# Patient Record
Sex: MALE | Race: WHITE | NOT HISPANIC OR LATINO
[De-identification: names, ages, dates, MRNs, and addresses within clinical notes are randomized per-mention and may not be internally consistent; named-entity substitution may affect disease eponyms.]

---

## 2019-08-26 ENCOUNTER — APPOINTMENT (OUTPATIENT)
Dept: INTERNAL MEDICINE | Facility: CLINIC | Age: 64
End: 2019-08-26

## 2020-03-13 ENCOUNTER — APPOINTMENT (OUTPATIENT)
Dept: INTERNAL MEDICINE | Facility: CLINIC | Age: 65
End: 2020-03-13

## 2020-11-13 ENCOUNTER — APPOINTMENT (OUTPATIENT)
Dept: INTERNAL MEDICINE | Facility: CLINIC | Age: 65
End: 2020-11-13
Payer: MEDICARE

## 2020-11-13 VITALS
OXYGEN SATURATION: 99 % | WEIGHT: 234 LBS | DIASTOLIC BLOOD PRESSURE: 90 MMHG | BODY MASS INDEX: 34.66 KG/M2 | HEIGHT: 69 IN | HEART RATE: 78 BPM | SYSTOLIC BLOOD PRESSURE: 130 MMHG

## 2020-11-13 DIAGNOSIS — J45.909 UNSPECIFIED ASTHMA, UNCOMPLICATED: ICD-10-CM

## 2020-11-13 DIAGNOSIS — Z83.3 FAMILY HISTORY OF DIABETES MELLITUS: ICD-10-CM

## 2020-11-13 DIAGNOSIS — I10 ESSENTIAL (PRIMARY) HYPERTENSION: ICD-10-CM

## 2020-11-13 PROCEDURE — 99203 OFFICE O/P NEW LOW 30 MIN: CPT

## 2020-11-13 NOTE — ASSESSMENT
[FreeTextEntry1] : Current blood pressure 140/80. Will continue current dose of amlodipine. Will start patient on Advair 250/50 b.i.d. Patient will return for fasting labs. Patient is to return in 6 weeks for complete exam.

## 2020-11-13 NOTE — PHYSICAL EXAM
[No Acute Distress] : no acute distress [Well Nourished] : well nourished [Well Developed] : well developed [Well-Appearing] : well-appearing [Normal Sclera/Conjunctiva] : normal sclera/conjunctiva [PERRL] : pupils equal round and reactive to light [EOMI] : extraocular movements intact [Normal Outer Ear/Nose] : the outer ears and nose were normal in appearance [Normal Oropharynx] : the oropharynx was normal [No JVD] : no jugular venous distention [No Lymphadenopathy] : no lymphadenopathy [Supple] : supple [Thyroid Normal, No Nodules] : the thyroid was normal and there were no nodules present [No Respiratory Distress] : no respiratory distress  [No Accessory Muscle Use] : no accessory muscle use [Clear to Auscultation] : lungs were clear to auscultation bilaterally [Normal Rate] : normal rate  [Regular Rhythm] : with a regular rhythm [Normal S1, S2] : normal S1 and S2 [No Murmur] : no murmur heard [No Carotid Bruits] : no carotid bruits [No Abdominal Bruit] : a ~M bruit was not heard ~T in the abdomen [No Varicosities] : no varicosities [Pedal Pulses Present] : the pedal pulses are present [No Palpable Aorta] : no palpable aorta [No Extremity Clubbing/Cyanosis] : no extremity clubbing/cyanosis [Soft] : abdomen soft [Non Tender] : non-tender [Non-distended] : non-distended [No Masses] : no abdominal mass palpated [No HSM] : no HSM [Normal Bowel Sounds] : normal bowel sounds [Normal Posterior Cervical Nodes] : no posterior cervical lymphadenopathy [Normal Anterior Cervical Nodes] : no anterior cervical lymphadenopathy [No CVA Tenderness] : no CVA  tenderness [No Spinal Tenderness] : no spinal tenderness [No Joint Swelling] : no joint swelling [Grossly Normal Strength/Tone] : grossly normal strength/tone [No Rash] : no rash [Coordination Grossly Intact] : coordination grossly intact [No Focal Deficits] : no focal deficits [Normal Gait] : normal gait [Normal Affect] : the affect was normal [Normal Insight/Judgement] : insight and judgment were intact [de-identified] : 1+ edema

## 2020-11-13 NOTE — HISTORY OF PRESENT ILLNESS
[FreeTextEntry1] : Evaluation for hypertension [de-identified] : 65-year-old male who recently 2 weeks ago went to an urgent care clinic for high problems. He states his blood pressure was 180/118. He was placed on amlodipine 5 mg. He checks his blood pressure in the morning and is usually about 140/80. He feels generally well. He has a history of intermittent asthma on Ventolin p.r.n. The past few weeks his asthma is bothering him and he's been using an inhaler about 2 times per day. He works in injection. He is 5 feet 9 weight 234. He is a nonsmoker. Past surgery total hip replacement. Current blood pressure 140/80.

## 2020-11-15 ENCOUNTER — LABORATORY RESULT (OUTPATIENT)
Age: 65
End: 2020-11-15

## 2020-11-16 ENCOUNTER — APPOINTMENT (OUTPATIENT)
Dept: INTERNAL MEDICINE | Facility: CLINIC | Age: 65
End: 2020-11-16

## 2020-11-16 RX ORDER — FLUTICASONE PROPIONATE AND SALMETEROL 250; 50 UG/1; UG/1
250-50 POWDER RESPIRATORY (INHALATION)
Qty: 1 | Refills: 4 | Status: ACTIVE | COMMUNITY
Start: 2020-11-16 | End: 1900-01-01

## 2020-11-16 RX ORDER — FLUTICASONE FUROATE AND VILANTEROL TRIFENATATE 100; 25 UG/1; UG/1
100-25 POWDER RESPIRATORY (INHALATION)
Qty: 1 | Refills: 5 | Status: ACTIVE | COMMUNITY
Start: 2020-11-16 | End: 1900-01-01

## 2020-12-04 ENCOUNTER — APPOINTMENT (OUTPATIENT)
Dept: INTERNAL MEDICINE | Facility: CLINIC | Age: 65
End: 2020-12-04
Payer: MEDICARE

## 2020-12-04 VITALS
BODY MASS INDEX: 34.66 KG/M2 | OXYGEN SATURATION: 98 % | DIASTOLIC BLOOD PRESSURE: 90 MMHG | TEMPERATURE: 97.5 F | SYSTOLIC BLOOD PRESSURE: 120 MMHG | WEIGHT: 234 LBS | HEIGHT: 69 IN | HEART RATE: 75 BPM

## 2020-12-04 PROCEDURE — 99213 OFFICE O/P EST LOW 20 MIN: CPT

## 2020-12-04 NOTE — ASSESSMENT
[FreeTextEntry1] : Patient is advised to lose weight and to return in 3 months. He also will be given a referral for an endocrine consult regarding low testosterone level.

## 2020-12-04 NOTE — HISTORY OF PRESENT ILLNESS
[FreeTextEntry1] : Patient returns for blood pressure check and followup labs [de-identified] : Patient is feeling better with asthma improved control with Advair. He has not lost weight. He feels generally well. No other prior history of hepatitis C with treatment 10 years ago. Labs were reviewed. He has a markedly low testosterone level. Patient will be referred to endocrine. Current blood pressure 130/90.

## 2021-02-12 ENCOUNTER — HOSPITAL ENCOUNTER (INPATIENT)
Dept: HOSPITAL 74 - JER | Age: 66
LOS: 4 days | Discharge: HOME | DRG: 603 | End: 2021-02-16
Attending: NURSE PRACTITIONER | Admitting: INTERNAL MEDICINE
Payer: COMMERCIAL

## 2021-02-12 VITALS — BODY MASS INDEX: 37.2 KG/M2

## 2021-02-12 DIAGNOSIS — G45.9: ICD-10-CM

## 2021-02-12 DIAGNOSIS — F11.20: ICD-10-CM

## 2021-02-12 DIAGNOSIS — J45.909: ICD-10-CM

## 2021-02-12 DIAGNOSIS — F14.10: ICD-10-CM

## 2021-02-12 DIAGNOSIS — B19.20: ICD-10-CM

## 2021-02-12 DIAGNOSIS — R55: ICD-10-CM

## 2021-02-12 DIAGNOSIS — I10: ICD-10-CM

## 2021-02-12 DIAGNOSIS — E66.9: ICD-10-CM

## 2021-02-12 DIAGNOSIS — L03.116: ICD-10-CM

## 2021-02-12 DIAGNOSIS — L03.115: Primary | ICD-10-CM

## 2021-02-12 PROCEDURE — C9803 HOPD COVID-19 SPEC COLLECT: HCPCS

## 2021-02-12 PROCEDURE — U0003 INFECTIOUS AGENT DETECTION BY NUCLEIC ACID (DNA OR RNA); SEVERE ACUTE RESPIRATORY SYNDROME CORONAVIRUS 2 (SARS-COV-2) (CORONAVIRUS DISEASE [COVID-19]), AMPLIFIED PROBE TECHNIQUE, MAKING USE OF HIGH THROUGHPUT TECHNOLOGIES AS DESCRIBED BY CMS-2020-01-R: HCPCS

## 2021-02-12 PROCEDURE — G0378 HOSPITAL OBSERVATION PER HR: HCPCS

## 2021-02-13 LAB
ALBUMIN SERPL-MCNC: 3.9 G/DL (ref 3.4–5)
ALP SERPL-CCNC: 69 U/L (ref 45–117)
ALT SERPL-CCNC: 19 U/L (ref 13–61)
AMPHET UR-MCNC: NEGATIVE NG/ML
ANION GAP SERPL CALC-SCNC: 7 MMOL/L (ref 8–16)
APPEARANCE UR: CLEAR
AST SERPL-CCNC: 18 U/L (ref 15–37)
BARBITURATES UR-MCNC: NEGATIVE NG/ML
BASOPHILS # BLD: 0.5 % (ref 0–2)
BENZODIAZ UR SCN-MCNC: NEGATIVE NG/ML
BILIRUB SERPL-MCNC: 0.7 MG/DL (ref 0.2–1)
BILIRUB UR STRIP.AUTO-MCNC: NEGATIVE MG/DL
BUN SERPL-MCNC: 18.2 MG/DL (ref 7–18)
CALCIUM SERPL-MCNC: 8.8 MG/DL (ref 8.5–10.1)
CHLORIDE SERPL-SCNC: 104 MMOL/L (ref 98–107)
CHOLEST SERPL-MCNC: 149 MG/DL (ref 50–200)
CO2 SERPL-SCNC: 28 MMOL/L (ref 21–32)
COCAINE UR-MCNC: NEGATIVE NG/ML
COLOR UR: YELLOW
CREAT SERPL-MCNC: 1.1 MG/DL (ref 0.55–1.3)
DEPRECATED RDW RBC AUTO: 14 % (ref 11.9–15.9)
EOSINOPHIL # BLD: 2.6 % (ref 0–4.5)
GLUCOSE SERPL-MCNC: 85 MG/DL (ref 74–106)
HCT VFR BLD CALC: 36.1 % (ref 35.4–49)
HDLC SERPL-MCNC: 66 MG/DL (ref 40–60)
HGB BLD-MCNC: 12.2 GM/DL (ref 11.7–16.9)
KETONES UR QL STRIP: (no result)
LDLC SERPL CALC-MCNC: 70 MG/DL (ref 5–100)
LEUKOCYTE ESTERASE UR QL STRIP.AUTO: NEGATIVE
LYMPHOCYTES # BLD: 13.4 % (ref 8–40)
MCH RBC QN AUTO: 32.1 PG (ref 25.7–33.7)
MCHC RBC AUTO-ENTMCNC: 33.9 G/DL (ref 32–35.9)
MCV RBC: 94.7 FL (ref 80–96)
METHADONE UR-MCNC: POSITIVE NG/ML
MONOCYTES # BLD AUTO: 8.8 % (ref 3.8–10.2)
NEUTROPHILS # BLD: 74.7 % (ref 42.8–82.8)
NITRITE UR QL STRIP: NEGATIVE
OPIATES UR QL SCN: POSITIVE NG/ML
PCP UR QL SCN: NEGATIVE NG/ML
PH UR: 5 [PH] (ref 5–8)
PLATELET # BLD AUTO: 284 K/MM3 (ref 134–434)
PMV BLD: 7.1 FL (ref 7.5–11.1)
POTASSIUM SERPLBLD-SCNC: 5.1 MMOL/L (ref 3.5–5.1)
PROT SERPL-MCNC: 8.2 G/DL (ref 6.4–8.2)
PROT UR QL STRIP: (no result)
PROT UR QL STRIP: NEGATIVE
RBC # BLD AUTO: 3.82 M/MM3 (ref 4–5.6)
SODIUM SERPL-SCNC: 139 MMOL/L (ref 136–145)
SP GR UR: 1.03 (ref 1.01–1.03)
TRIGL SERPL-MCNC: 77 MG/DL (ref 0–150)
UROBILINOGEN UR STRIP-MCNC: 1 MG/DL (ref 0.2–1)
WBC # BLD AUTO: 7.2 K/MM3 (ref 4–10)

## 2021-02-13 RX ADMIN — METHADONE HYDROCHLORIDE SCH MG: 40 TABLET ORAL at 12:42

## 2021-02-13 RX ADMIN — CLINDAMYCIN PHOSPHATE SCH MLS/HR: 600 INJECTION, SOLUTION INTRAVENOUS at 09:17

## 2021-02-13 RX ADMIN — ENOXAPARIN SODIUM SCH MG: 40 INJECTION SUBCUTANEOUS at 09:17

## 2021-02-13 RX ADMIN — CLINDAMYCIN PHOSPHATE SCH MLS/HR: 600 INJECTION, SOLUTION INTRAVENOUS at 18:10

## 2021-02-14 LAB
ALBUMIN SERPL-MCNC: 3.6 G/DL (ref 3.4–5)
ALP SERPL-CCNC: 49 U/L (ref 45–117)
ALT SERPL-CCNC: 11 U/L (ref 13–61)
ANION GAP SERPL CALC-SCNC: 8 MMOL/L (ref 8–16)
AST SERPL-CCNC: 18 U/L (ref 15–37)
BASOPHILS # BLD: 2.9 % (ref 0–2)
BILIRUB SERPL-MCNC: 0.9 MG/DL (ref 0.2–1)
BUN SERPL-MCNC: 21 MG/DL (ref 7–18)
CALCIUM SERPL-MCNC: 8.6 MG/DL (ref 8.5–10)
CHLORIDE SERPL-SCNC: 102 MMOL/L (ref 98–107)
CHOLEST SERPL-MCNC: 127 MG/DL (ref 50–200)
CO2 SERPL-SCNC: 25 MMOL/L (ref 21–32)
CREAT SERPL-MCNC: 0.9 MG/DL (ref 0.55–1.3)
DEPRECATED RDW RBC AUTO: 13.1 % (ref 11.9–15.9)
EOSINOPHIL # BLD: 5.3 % (ref 0–4.5)
GLUCOSE SERPL-MCNC: 78 MG/DL (ref 74–106)
HCT VFR BLD CALC: 37.5 % (ref 35.4–49)
HDLC SERPL-MCNC: 52 MG/DL (ref 40–60)
HGB BLD-MCNC: 11.9 GM/DL (ref 11.7–16.9)
LDLC SERPL CALC-MCNC: 67 MG/DL (ref 5–100)
LYMPHOCYTES # BLD: 24.2 % (ref 8–40)
MAGNESIUM SERPL-MCNC: 2 MG/DL (ref 1.8–2.4)
MCH RBC QN AUTO: 30.5 PG (ref 25.7–33.7)
MCHC RBC AUTO-ENTMCNC: 31.8 G/DL (ref 32–35.9)
MCV RBC: 95.9 FL (ref 80–96)
MONOCYTES # BLD AUTO: 12.7 % (ref 3.8–10.2)
NEUTROPHILS # BLD: 54.9 % (ref 42.8–82.8)
PHOSPHATE SERPL-MCNC: 3.2 MG/DL (ref 2.5–4.9)
PLATELET # BLD AUTO: 240 K/MM3 (ref 134–434)
PMV BLD: 7.6 FL (ref 7.5–11.1)
POTASSIUM SERPLBLD-SCNC: 4.7 MMOL/L (ref 3.5–5.1)
PROT SERPL-MCNC: 7.2 G/DL (ref 6.4–8.2)
RBC # BLD AUTO: 3.91 M/MM3 (ref 4–5.6)
SODIUM SERPL-SCNC: 135 MMOL/L (ref 136–145)
TRIGL SERPL-MCNC: 39 MG/DL (ref 0–150)
WBC # BLD AUTO: 4.9 K/MM3 (ref 4–10.8)

## 2021-02-14 RX ADMIN — CLINDAMYCIN PHOSPHATE SCH MLS/HR: 600 INJECTION, SOLUTION INTRAVENOUS at 01:07

## 2021-02-14 RX ADMIN — CLINDAMYCIN PHOSPHATE SCH MLS/HR: 600 INJECTION, SOLUTION INTRAVENOUS at 18:01

## 2021-02-14 RX ADMIN — METHADONE HYDROCHLORIDE SCH MG: 40 TABLET ORAL at 05:52

## 2021-02-14 RX ADMIN — DOCUSATE SODIUM SCH MG: 100 CAPSULE, LIQUID FILLED ORAL at 21:17

## 2021-02-14 RX ADMIN — CLINDAMYCIN PHOSPHATE SCH MLS/HR: 600 INJECTION, SOLUTION INTRAVENOUS at 09:58

## 2021-02-14 RX ADMIN — ENOXAPARIN SODIUM SCH MG: 40 INJECTION SUBCUTANEOUS at 09:58

## 2021-02-14 RX ADMIN — AMLODIPINE BESYLATE SCH MG: 5 TABLET ORAL at 12:50

## 2021-02-15 RX ADMIN — CLINDAMYCIN PHOSPHATE SCH MLS/HR: 600 INJECTION, SOLUTION INTRAVENOUS at 17:55

## 2021-02-15 RX ADMIN — BUDESONIDE AND FORMOTEROL FUMARATE DIHYDRATE SCH INH: 80; 4.5 AEROSOL RESPIRATORY (INHALATION) at 09:14

## 2021-02-15 RX ADMIN — AMLODIPINE BESYLATE SCH MG: 5 TABLET ORAL at 09:13

## 2021-02-15 RX ADMIN — DOCUSATE SODIUM SCH: 100 CAPSULE, LIQUID FILLED ORAL at 21:20

## 2021-02-15 RX ADMIN — ASPIRIN SCH MG: 81 TABLET, COATED ORAL at 12:06

## 2021-02-15 RX ADMIN — CLINDAMYCIN PHOSPHATE SCH MLS/HR: 600 INJECTION, SOLUTION INTRAVENOUS at 02:00

## 2021-02-15 RX ADMIN — METHADONE HYDROCHLORIDE SCH MG: 40 TABLET ORAL at 06:19

## 2021-02-15 RX ADMIN — ENOXAPARIN SODIUM SCH MG: 40 INJECTION SUBCUTANEOUS at 09:13

## 2021-02-15 RX ADMIN — DOCUSATE SODIUM SCH: 100 CAPSULE, LIQUID FILLED ORAL at 09:13

## 2021-02-15 RX ADMIN — CLINDAMYCIN PHOSPHATE SCH MLS/HR: 600 INJECTION, SOLUTION INTRAVENOUS at 09:13

## 2021-02-15 RX ADMIN — BUDESONIDE AND FORMOTEROL FUMARATE DIHYDRATE SCH INH: 80; 4.5 AEROSOL RESPIRATORY (INHALATION) at 21:20

## 2021-02-16 VITALS — HEART RATE: 70 BPM | DIASTOLIC BLOOD PRESSURE: 67 MMHG | SYSTOLIC BLOOD PRESSURE: 146 MMHG

## 2021-02-16 RX ADMIN — ASPIRIN SCH MG: 81 TABLET, COATED ORAL at 10:01

## 2021-02-16 RX ADMIN — ENOXAPARIN SODIUM SCH MG: 40 INJECTION SUBCUTANEOUS at 10:02

## 2021-02-16 RX ADMIN — AMLODIPINE BESYLATE SCH MG: 5 TABLET ORAL at 10:01

## 2021-02-16 RX ADMIN — CLINDAMYCIN PHOSPHATE SCH MLS/HR: 600 INJECTION, SOLUTION INTRAVENOUS at 00:59

## 2021-02-16 RX ADMIN — DOCUSATE SODIUM SCH: 100 CAPSULE, LIQUID FILLED ORAL at 10:01

## 2021-02-16 RX ADMIN — CLINDAMYCIN PHOSPHATE SCH MLS/HR: 600 INJECTION, SOLUTION INTRAVENOUS at 10:01

## 2021-02-16 RX ADMIN — BUDESONIDE AND FORMOTEROL FUMARATE DIHYDRATE SCH INH: 80; 4.5 AEROSOL RESPIRATORY (INHALATION) at 10:02

## 2021-02-16 RX ADMIN — METHADONE HYDROCHLORIDE SCH MG: 40 TABLET ORAL at 06:12

## 2021-03-05 ENCOUNTER — APPOINTMENT (OUTPATIENT)
Dept: INTERNAL MEDICINE | Facility: CLINIC | Age: 66
End: 2021-03-05
Payer: MEDICARE

## 2021-03-05 VITALS
WEIGHT: 241 LBS | OXYGEN SATURATION: 96 % | DIASTOLIC BLOOD PRESSURE: 100 MMHG | TEMPERATURE: 98.1 F | HEIGHT: 69 IN | HEART RATE: 91 BPM | SYSTOLIC BLOOD PRESSURE: 140 MMHG | BODY MASS INDEX: 35.7 KG/M2

## 2021-03-05 DIAGNOSIS — I10 ESSENTIAL (PRIMARY) HYPERTENSION: ICD-10-CM

## 2021-03-05 DIAGNOSIS — J45.909 UNSPECIFIED ASTHMA, UNCOMPLICATED: ICD-10-CM

## 2021-03-05 PROCEDURE — 99213 OFFICE O/P EST LOW 20 MIN: CPT

## 2021-03-05 RX ORDER — AMLODIPINE BESYLATE 10 MG/1
10 TABLET ORAL DAILY
Qty: 90 | Refills: 3 | Status: ACTIVE | COMMUNITY
Start: 2021-03-05 | End: 1900-01-01

## 2021-03-05 NOTE — ASSESSMENT
[FreeTextEntry1] : Blood pressure elevated at 140/95. Will increase amlodipine to 10 mg daily. Patient is to observe his lower extremity edema. He is to return in 2 months. He has been given a referral to endocrine regarding low testosterone level.

## 2021-03-05 NOTE — PHYSICAL EXAM
[Well Nourished] : well nourished [Well Developed] : well developed [Well-Appearing] : well-appearing [Normal Sclera/Conjunctiva] : normal sclera/conjunctiva [PERRL] : pupils equal round and reactive to light [EOMI] : extraocular movements intact [Normal Outer Ear/Nose] : the outer ears and nose were normal in appearance [Normal Oropharynx] : the oropharynx was normal [No JVD] : no jugular venous distention [No Lymphadenopathy] : no lymphadenopathy [Supple] : supple [Thyroid Normal, No Nodules] : the thyroid was normal and there were no nodules present [No Respiratory Distress] : no respiratory distress  [No Accessory Muscle Use] : no accessory muscle use [Clear to Auscultation] : lungs were clear to auscultation bilaterally [Normal Rate] : normal rate  [Regular Rhythm] : with a regular rhythm [Normal S1, S2] : normal S1 and S2 [No Murmur] : no murmur heard [No Carotid Bruits] : no carotid bruits [No Abdominal Bruit] : a ~M bruit was not heard ~T in the abdomen [No Varicosities] : no varicosities [Pedal Pulses Present] : the pedal pulses are present [No Palpable Aorta] : no palpable aorta [No Extremity Clubbing/Cyanosis] : no extremity clubbing/cyanosis [Soft] : abdomen soft [Non Tender] : non-tender [Non-distended] : non-distended [No Masses] : no abdominal mass palpated [No HSM] : no HSM [Normal Bowel Sounds] : normal bowel sounds [Normal Posterior Cervical Nodes] : no posterior cervical lymphadenopathy [Normal Anterior Cervical Nodes] : no anterior cervical lymphadenopathy [No CVA Tenderness] : no CVA  tenderness [No Spinal Tenderness] : no spinal tenderness [No Joint Swelling] : no joint swelling [Grossly Normal Strength/Tone] : grossly normal strength/tone [No Rash] : no rash [Coordination Grossly Intact] : coordination grossly intact [No Focal Deficits] : no focal deficits [Normal Gait] : normal gait [Normal Affect] : the affect was normal [Normal Insight/Judgement] : insight and judgment were intact [de-identified] : 2+ nonpitting edema

## 2021-03-05 NOTE — HISTORY OF PRESENT ILLNESS
[FreeTextEntry1] : Followup for hypertension and asthma. [de-identified] : Asthma or eczema controlled with Advair. Currently on amlodipine 5 mg daily. Blood pressure at home usually 145/90. He recently saw his GI specialist and was told with fatty liver on ultrasound. He has a history of lymphedema of the lower extremities but does not feel the extremities are larger with the use of amlodipine. Current blood pressure 140/95

## 2021-05-12 ENCOUNTER — APPOINTMENT (OUTPATIENT)
Dept: INTERNAL MEDICINE | Facility: CLINIC | Age: 66
End: 2021-05-12

## 2021-05-19 ENCOUNTER — HOSPITAL ENCOUNTER (EMERGENCY)
Dept: HOSPITAL 74 - JER | Age: 66
Discharge: HOME | End: 2021-05-19
Payer: COMMERCIAL

## 2021-05-19 VITALS — HEART RATE: 75 BPM | DIASTOLIC BLOOD PRESSURE: 72 MMHG | SYSTOLIC BLOOD PRESSURE: 115 MMHG

## 2021-05-19 VITALS — BODY MASS INDEX: 36.1 KG/M2

## 2021-05-19 VITALS — TEMPERATURE: 98 F

## 2021-05-19 DIAGNOSIS — L03.116: Primary | ICD-10-CM

## 2021-05-19 DIAGNOSIS — L03.115: ICD-10-CM

## 2021-05-19 LAB
ALBUMIN SERPL-MCNC: 4 G/DL (ref 3.4–5)
ALBUMIN SERPL-MCNC: 4.2 G/DL (ref 3.4–5)
ALP SERPL-CCNC: 66 U/L (ref 45–117)
ALP SERPL-CCNC: 68 U/L (ref 45–117)
ALT SERPL-CCNC: 21 U/L (ref 13–61)
ALT SERPL-CCNC: 24 U/L (ref 13–61)
ANION GAP SERPL CALC-SCNC: 6 MMOL/L (ref 8–16)
ANION GAP SERPL CALC-SCNC: 6 MMOL/L (ref 8–16)
AST SERPL-CCNC: 30 U/L (ref 15–37)
AST SERPL-CCNC: 56 U/L (ref 15–37)
BASOPHILS # BLD: 0.5 % (ref 0–2)
BILIRUB SERPL-MCNC: 0.5 MG/DL (ref 0.2–1)
BILIRUB SERPL-MCNC: 0.6 MG/DL (ref 0.2–1)
BUN SERPL-MCNC: 24.3 MG/DL (ref 7–18)
BUN SERPL-MCNC: 25.2 MG/DL (ref 7–18)
CALCIUM SERPL-MCNC: 8.9 MG/DL (ref 8.5–10.1)
CALCIUM SERPL-MCNC: 9.3 MG/DL (ref 8.5–10.1)
CHLORIDE SERPL-SCNC: 104 MMOL/L (ref 98–107)
CHLORIDE SERPL-SCNC: 106 MMOL/L (ref 98–107)
CO2 SERPL-SCNC: 26 MMOL/L (ref 21–32)
CO2 SERPL-SCNC: 29 MMOL/L (ref 21–32)
CREAT SERPL-MCNC: 0.9 MG/DL (ref 0.55–1.3)
CREAT SERPL-MCNC: 0.9 MG/DL (ref 0.55–1.3)
DEPRECATED RDW RBC AUTO: 13.7 % (ref 11.9–15.9)
EOSINOPHIL # BLD: 3.4 % (ref 0–4.5)
GLUCOSE SERPL-MCNC: 75 MG/DL (ref 74–106)
GLUCOSE SERPL-MCNC: 78 MG/DL (ref 74–106)
HCT VFR BLD CALC: 34.2 % (ref 35.4–49)
HGB BLD-MCNC: 11.5 GM/DL (ref 11.7–16.9)
LYMPHOCYTES # BLD: 18.3 % (ref 8–40)
MCH RBC QN AUTO: 31.3 PG (ref 25.7–33.7)
MCHC RBC AUTO-ENTMCNC: 33.4 G/DL (ref 32–35.9)
MCV RBC: 93.6 FL (ref 80–96)
MONOCYTES # BLD AUTO: 9.7 % (ref 3.8–10.2)
NEUTROPHILS # BLD: 68.1 % (ref 42.8–82.8)
PLATELET # BLD AUTO: 227 K/MM3 (ref 134–434)
PMV BLD: 7.6 FL (ref 7.5–11.1)
PROT SERPL-MCNC: 8 G/DL (ref 6.4–8.2)
PROT SERPL-MCNC: 8.6 G/DL (ref 6.4–8.2)
RBC # BLD AUTO: 3.66 M/MM3 (ref 4–5.6)
SODIUM SERPL-SCNC: 138 MMOL/L (ref 136–145)
SODIUM SERPL-SCNC: 139 MMOL/L (ref 136–145)
WBC # BLD AUTO: 5.7 K/MM3 (ref 4–10)

## 2021-06-08 ENCOUNTER — APPOINTMENT (OUTPATIENT)
Dept: GASTROENTEROLOGY | Facility: CLINIC | Age: 66
End: 2021-06-08
Payer: MEDICARE

## 2021-06-08 ENCOUNTER — NON-APPOINTMENT (OUTPATIENT)
Age: 66
End: 2021-06-08

## 2021-06-08 VITALS
SYSTOLIC BLOOD PRESSURE: 132 MMHG | HEIGHT: 69 IN | DIASTOLIC BLOOD PRESSURE: 90 MMHG | TEMPERATURE: 97.6 F | RESPIRATION RATE: 16 BRPM | HEART RATE: 73 BPM | OXYGEN SATURATION: 95 % | BODY MASS INDEX: 35.7 KG/M2 | WEIGHT: 241 LBS

## 2021-06-08 DIAGNOSIS — Z12.11 ENCOUNTER FOR SCREENING FOR MALIGNANT NEOPLASM OF COLON: ICD-10-CM

## 2021-06-08 DIAGNOSIS — I86.4 GASTRIC VARICES: ICD-10-CM

## 2021-06-08 PROCEDURE — 99204 OFFICE O/P NEW MOD 45 MIN: CPT

## 2021-06-08 RX ORDER — AMLODIPINE BESYLATE 5 MG/1
5 TABLET ORAL DAILY
Qty: 90 | Refills: 3 | Status: DISCONTINUED | COMMUNITY
Start: 2020-11-13 | End: 2021-06-08

## 2021-06-08 RX ORDER — AMLODIPINE BESYLATE 5 MG/1
5 TABLET ORAL
Refills: 0 | Status: DISCONTINUED | COMMUNITY
End: 2021-06-08

## 2021-06-08 RX ORDER — FLUTICASONE PROPIONATE AND SALMETEROL 250; 50 UG/1; UG/1
250-50 POWDER RESPIRATORY (INHALATION) TWICE DAILY
Qty: 3 | Refills: 3 | Status: DISCONTINUED | COMMUNITY
Start: 2020-11-13 | End: 2021-06-08

## 2021-06-08 NOTE — ASSESSMENT
[FreeTextEntry1] : Will plan on a colonoscopy for screening.  Explained risks/benefits/alternatives including not limited to bleeding, infection, perforation, missed lesions, anesthesia complications.  Patient understands and agrees, all questions answered.  Will use a split dose miralax/gatorade prep with clears the day prior.\par \par Will plan on an upper endoscopy for gastric varices on a prior CT scan.  Explained risks/benefits/alternatives including not limited to bleeding, infection, perforation, missed lesions, anesthesia complications.  Patient understands and agrees, all questions answered.\par \par May need an abd U/S to r/o cirrhosis, although his liver was not cirrhotic appearing on his prior CT scan.  Concern given report of possible gastric varices, prominent portal vein, borderline spleen size, may be c/w cirrhosis with portal HTN.\par \par Thank you for referring Ms. PORTILLO.  Please do not hesitate to call to further discuss his/her care.\par \par Note faxed to requesting MD.\par \par

## 2021-06-08 NOTE — PHYSICAL EXAM
[General Appearance - Alert] : alert [General Appearance - In No Acute Distress] : in no acute distress [Sclera] : the sclera and conjunctiva were normal [Outer Ear] : the ears and nose were normal in appearance [Neck Appearance] : the appearance of the neck was normal [] : no respiratory distress [Apical Impulse] : the apical impulse was normal [Abdomen Soft] : soft [Abdomen Tenderness] : non-tender [Abnormal Walk] : normal gait [Skin Color & Pigmentation] : normal skin color and pigmentation [Cranial Nerves] : cranial nerves 2-12 were intact [Oriented To Time, Place, And Person] : oriented to person, place, and time [FreeTextEntry1] : Reducible soft umbilical hernia, no tenderness

## 2021-06-08 NOTE — HISTORY OF PRESENT ILLNESS
[FreeTextEntry1] : Mr. Hess is a pleasant 66M h/o HCV s/p treatment with Harvoni (at [facility], states was told he does not have cirrhosis), HTN, umbilical hernia, asthma, prior opioid addiction on chronic methadone who is referred by Dr. Barbour for colon cancer screening.  \par \par Last colonoscopy was 12-13 years ago, as per his report no polyps.\par \par Mild occasional constipation, no diarrhea. No abd pain, heartburn, regurgitation.  Otherwise feels well.\par \par CT A/P 6/20/19:\par IMPRESSION:\par -A fat-containing midline umbilical hernia measures 4.9 cm in width. No bowel or fluid is identified within the hernia. Diffuse increased subcutaneous density in the abdominal wall has the appearance of scarring.\par -No evidence of focal hepatic mass or diffuse hepatic parenchymal abnormality.\par -Borderline spleen size, slightly prominent main portal vein, and possible very small varices at medial aspect of stomach. No evidence of ascites.\par -An ovoid cystic lesion in the left mesentery measures 3.4 x 1.9 x 2 cm.\par \par Does not smoke, rarely has a glass of wine.\par \par No FHx of any GI malignancies.

## 2021-07-13 ENCOUNTER — RESULT REVIEW (OUTPATIENT)
Age: 66
End: 2021-07-13

## 2021-07-14 ENCOUNTER — APPOINTMENT (OUTPATIENT)
Dept: GASTROENTEROLOGY | Facility: HOSPITAL | Age: 66
End: 2021-07-14

## 2021-07-30 ENCOUNTER — TRANSCRIPTION ENCOUNTER (OUTPATIENT)
Age: 66
End: 2021-07-30

## 2021-08-27 ENCOUNTER — HOSPITAL ENCOUNTER (EMERGENCY)
Dept: HOSPITAL 74 - FER | Age: 66
Discharge: HOME | End: 2021-08-27
Payer: COMMERCIAL

## 2021-08-27 VITALS — BODY MASS INDEX: 36.9 KG/M2

## 2021-08-27 VITALS — DIASTOLIC BLOOD PRESSURE: 81 MMHG | HEART RATE: 79 BPM | TEMPERATURE: 98 F | SYSTOLIC BLOOD PRESSURE: 135 MMHG

## 2021-08-27 DIAGNOSIS — M94.0: Primary | ICD-10-CM

## 2021-08-27 DIAGNOSIS — L03.115: ICD-10-CM

## 2021-08-27 LAB
ALBUMIN SERPL-MCNC: 4 G/DL (ref 3.4–5)
ALP SERPL-CCNC: 60 U/L (ref 45–117)
ALT SERPL-CCNC: 25 U/L (ref 13–61)
ANION GAP SERPL CALC-SCNC: 8 MMOL/L (ref 8–16)
AST SERPL-CCNC: 33 U/L (ref 15–37)
BASOPHILS # BLD: 1 % (ref 0–2)
BILIRUB SERPL-MCNC: 0.9 MG/DL (ref 0.2–1)
BUN SERPL-MCNC: 25 MG/DL (ref 7–18)
CALCIUM SERPL-MCNC: 8.7 MG/DL (ref 8.5–10)
CHLORIDE SERPL-SCNC: 101 MMOL/L (ref 98–107)
CO2 SERPL-SCNC: 25 MMOL/L (ref 21–32)
CREAT SERPL-MCNC: 0.9 MG/DL (ref 0.55–1.3)
DEPRECATED RDW RBC AUTO: 12.3 % (ref 11.9–15.9)
EOSINOPHIL # BLD: 2.4 % (ref 0–4.5)
GLUCOSE SERPL-MCNC: 88 MG/DL (ref 74–106)
HCT VFR BLD CALC: 34.6 % (ref 35.4–49)
HGB BLD-MCNC: 11.3 GM/DL (ref 11.7–16.9)
LYMPHOCYTES # BLD: 16.5 % (ref 8–40)
MCH RBC QN AUTO: 30.9 PG (ref 25.7–33.7)
MCHC RBC AUTO-ENTMCNC: 32.5 G/DL (ref 32–35.9)
MCV RBC: 94.9 FL (ref 80–96)
MONOCYTES # BLD AUTO: 14 % (ref 3.8–10.2)
NEUTROPHILS # BLD: 66.1 % (ref 42.8–82.8)
PLATELET # BLD AUTO: 209 10^3/UL (ref 134–434)
PMV BLD: 7.3 FL (ref 7.5–11.1)
PROT SERPL-MCNC: 7.7 G/DL (ref 6.4–8.2)
RBC # BLD AUTO: 3.65 M/MM3 (ref 4–5.6)
SODIUM SERPL-SCNC: 134 MMOL/L (ref 136–145)
WBC # BLD AUTO: 5.6 K/MM3 (ref 4–10.8)

## 2021-11-19 RX ORDER — AMLODIPINE BESYLATE 5 MG/1
5 TABLET ORAL DAILY
Qty: 90 | Refills: 3 | Status: ACTIVE | COMMUNITY
Start: 2021-11-19 | End: 1900-01-01

## 2022-01-24 ENCOUNTER — RESULT REVIEW (OUTPATIENT)
Age: 67
End: 2022-01-24

## 2024-03-13 ENCOUNTER — APPOINTMENT (OUTPATIENT)
Dept: ORTHOPEDIC SURGERY | Facility: CLINIC | Age: 69
End: 2024-03-13
Payer: MEDICARE

## 2024-03-13 VITALS — HEIGHT: 69 IN | BODY MASS INDEX: 38.51 KG/M2 | WEIGHT: 260 LBS

## 2024-03-13 PROCEDURE — 99203 OFFICE O/P NEW LOW 30 MIN: CPT

## 2024-03-13 NOTE — PHYSICAL EXAM
[de-identified] : has walker but walking in room withouht it without difficulty no pain w resisted hip flexion or ab/adduction no pain w rom of hip no ttp no motor or sensory deficits pain free slr [de-identified] : fletcher looks good

## 2024-03-13 NOTE — DISCUSSION/SUMMARY
[de-identified] : possible muscle strain hes getting better on his own will cont to take occ nsaids and rest fu in 3 weeks if not better then consider pt

## 2024-03-13 NOTE — HISTORY OF PRESENT ILLNESS
[de-identified] : right fletcher 4 years ago did well except lymphedema, got aspirated and no infection works as  and stepping on ladder noticed sharp pain in hip no radiation went to er now about 40 percent better took some advil

## 2024-03-20 ENCOUNTER — APPOINTMENT (OUTPATIENT)
Dept: ORTHOPEDIC SURGERY | Facility: CLINIC | Age: 69
End: 2024-03-20
Payer: MEDICARE

## 2024-03-20 DIAGNOSIS — Z79.1 LONG TERM (CURRENT) USE OF NON-STEROIDAL ANTI-INFLAMMATORIES (NSAID): ICD-10-CM

## 2024-03-20 DIAGNOSIS — M79.18 MYALGIA, OTHER SITE: ICD-10-CM

## 2024-03-20 DIAGNOSIS — Z96.642 PRESENCE OF LEFT ARTIFICIAL HIP JOINT: ICD-10-CM

## 2024-03-20 DIAGNOSIS — M76.891 OTHER SPECIFIED ENTHESOPATHIES OF RIGHT LOWER LIMB, EXCLUDING FOOT: ICD-10-CM

## 2024-03-20 DIAGNOSIS — Z96.641 PRESENCE OF RIGHT ARTIFICIAL HIP JOINT: ICD-10-CM

## 2024-03-20 PROCEDURE — 99442: CPT | Mod: 93

## 2024-04-03 ENCOUNTER — APPOINTMENT (OUTPATIENT)
Dept: ORTHOPEDIC SURGERY | Facility: CLINIC | Age: 69
End: 2024-04-03

## 2024-06-13 ENCOUNTER — TRANSCRIPTION ENCOUNTER (OUTPATIENT)
Age: 69
End: 2024-06-13

## 2024-10-07 ENCOUNTER — NON-APPOINTMENT (OUTPATIENT)
Age: 69
End: 2024-10-07

## 2024-10-07 ENCOUNTER — APPOINTMENT (OUTPATIENT)
Dept: GASTROENTEROLOGY | Facility: CLINIC | Age: 69
End: 2024-10-07
Payer: MEDICARE

## 2024-10-07 VITALS
DIASTOLIC BLOOD PRESSURE: 74 MMHG | BODY MASS INDEX: 32.58 KG/M2 | HEART RATE: 90 BPM | OXYGEN SATURATION: 98 % | HEIGHT: 69 IN | SYSTOLIC BLOOD PRESSURE: 120 MMHG | WEIGHT: 220 LBS

## 2024-10-07 PROCEDURE — 99204 OFFICE O/P NEW MOD 45 MIN: CPT

## 2024-10-07 PROCEDURE — G2211 COMPLEX E/M VISIT ADD ON: CPT

## 2024-10-07 RX ORDER — TESTOSTERONE CYPIONATE 100 MG/ML
100 INJECTION, SOLUTION INTRAMUSCULAR
Refills: 0 | Status: ACTIVE | COMMUNITY

## 2024-10-08 ENCOUNTER — APPOINTMENT (OUTPATIENT)
Dept: SURGERY | Facility: CLINIC | Age: 69
End: 2024-10-08
Payer: MEDICARE

## 2024-10-08 VITALS
HEART RATE: 66 BPM | OXYGEN SATURATION: 97 % | DIASTOLIC BLOOD PRESSURE: 75 MMHG | SYSTOLIC BLOOD PRESSURE: 129 MMHG | WEIGHT: 222.8 LBS | HEIGHT: 69 IN | BODY MASS INDEX: 33 KG/M2

## 2024-10-08 DIAGNOSIS — K43.9 VENTRAL HERNIA W/OUT OBSTRUCTION OR GANGRENE: ICD-10-CM

## 2024-10-08 PROCEDURE — 99204 OFFICE O/P NEW MOD 45 MIN: CPT

## 2024-10-29 ENCOUNTER — APPOINTMENT (OUTPATIENT)
Dept: SURGICAL ONCOLOGY | Facility: CLINIC | Age: 69
End: 2024-10-29
Payer: MEDICARE

## 2024-10-29 VITALS
BODY MASS INDEX: 32.54 KG/M2 | HEIGHT: 69 IN | WEIGHT: 219.66 LBS | OXYGEN SATURATION: 95 % | DIASTOLIC BLOOD PRESSURE: 54 MMHG | SYSTOLIC BLOOD PRESSURE: 113 MMHG | HEART RATE: 61 BPM

## 2024-10-29 DIAGNOSIS — K80.20 CALCULUS OF GALLBLADDER W/OUT CHOLECYSTITIS W/OUT OBSTRUCTION: ICD-10-CM

## 2024-10-29 DIAGNOSIS — K86.89 OTHER SPECIFIED DISEASES OF PANCREAS: ICD-10-CM

## 2024-10-29 PROCEDURE — 99204 OFFICE O/P NEW MOD 45 MIN: CPT

## 2024-10-29 RX ORDER — METHADONE HYDROCHLORIDE 5 MG/1
5 TABLET ORAL
Refills: 0 | Status: ACTIVE | COMMUNITY

## 2024-10-29 RX ORDER — TIRZEPATIDE 7.5 MG/.5ML
7.5 INJECTION, SOLUTION SUBCUTANEOUS
Refills: 0 | Status: ACTIVE | COMMUNITY

## 2024-10-29 RX ORDER — TADALAFIL 5 MG/1
5 TABLET ORAL
Refills: 0 | Status: ACTIVE | COMMUNITY

## 2024-10-30 PROBLEM — K86.89 PANCREATIC INSUFFICIENCY: Status: ACTIVE | Noted: 2024-10-30

## 2024-10-30 PROBLEM — K80.20 GALLSTONES: Status: ACTIVE | Noted: 2024-10-30

## 2024-10-30 RX ORDER — PANCRELIPASE 36000; 180000; 114000 [USP'U]/1; [USP'U]/1; [USP'U]/1
36000-114000 CAPSULE, DELAYED RELEASE PELLETS ORAL
Qty: 240 | Refills: 0 | Status: ACTIVE | COMMUNITY
Start: 2024-10-30 | End: 1900-01-01

## 2024-11-10 LAB
ANION GAP SERPL CALC-SCNC: 10 MMOL/L
BUN SERPL-MCNC: 15 MG/DL
CALCIUM SERPL-MCNC: 9 MG/DL
CHLORIDE SERPL-SCNC: 97 MMOL/L
CO2 SERPL-SCNC: 27 MMOL/L
CREAT SERPL-MCNC: 1.12 MG/DL
EGFR: 71 ML/MIN/1.73M2
GLUCOSE SERPL-MCNC: 90 MG/DL
POTASSIUM SERPL-SCNC: 7.6 MMOL/L
SODIUM SERPL-SCNC: 134 MMOL/L

## 2024-11-16 ENCOUNTER — APPOINTMENT (OUTPATIENT)
Dept: CT IMAGING | Facility: HOSPITAL | Age: 69
End: 2024-11-16

## 2024-11-16 ENCOUNTER — OUTPATIENT (OUTPATIENT)
Dept: OUTPATIENT SERVICES | Facility: HOSPITAL | Age: 69
LOS: 1 days | End: 2024-11-16
Payer: MEDICARE

## 2024-11-16 ENCOUNTER — RESULT REVIEW (OUTPATIENT)
Age: 69
End: 2024-11-16

## 2024-11-16 PROCEDURE — 74177 CT ABD & PELVIS W/CONTRAST: CPT | Mod: MH

## 2024-11-16 PROCEDURE — 82565 ASSAY OF CREATININE: CPT

## 2024-11-16 PROCEDURE — 74177 CT ABD & PELVIS W/CONTRAST: CPT | Mod: 26,MH

## 2024-11-26 ENCOUNTER — APPOINTMENT (OUTPATIENT)
Dept: SURGICAL ONCOLOGY | Facility: CLINIC | Age: 69
End: 2024-11-26
Payer: MEDICARE

## 2024-11-26 PROCEDURE — 99214 OFFICE O/P EST MOD 30 MIN: CPT

## 2024-12-04 ENCOUNTER — RESULT REVIEW (OUTPATIENT)
Age: 69
End: 2024-12-04

## 2024-12-04 ENCOUNTER — TRANSCRIPTION ENCOUNTER (OUTPATIENT)
Age: 69
End: 2024-12-04

## 2024-12-04 ENCOUNTER — APPOINTMENT (OUTPATIENT)
Dept: SURGERY | Facility: HOSPITAL | Age: 69
End: 2024-12-04

## 2024-12-18 ENCOUNTER — APPOINTMENT (OUTPATIENT)
Dept: SURGERY | Facility: CLINIC | Age: 69
End: 2024-12-18
Payer: MEDICARE

## 2024-12-18 VITALS
WEIGHT: 205 LBS | TEMPERATURE: 98 F | HEART RATE: 94 BPM | HEIGHT: 69 IN | OXYGEN SATURATION: 98 % | BODY MASS INDEX: 30.36 KG/M2 | DIASTOLIC BLOOD PRESSURE: 70 MMHG | SYSTOLIC BLOOD PRESSURE: 113 MMHG

## 2024-12-18 DIAGNOSIS — K43.9 VENTRAL HERNIA W/OUT OBSTRUCTION OR GANGRENE: ICD-10-CM

## 2024-12-18 PROCEDURE — 99212 OFFICE O/P EST SF 10 MIN: CPT

## 2024-12-31 ENCOUNTER — RX RENEWAL (OUTPATIENT)
Age: 69
End: 2024-12-31

## 2024-12-31 ENCOUNTER — APPOINTMENT (OUTPATIENT)
Dept: SURGERY | Facility: CLINIC | Age: 69
End: 2024-12-31

## 2025-01-07 ENCOUNTER — APPOINTMENT (OUTPATIENT)
Dept: SURGERY | Facility: CLINIC | Age: 70
End: 2025-01-07
Payer: MEDICARE

## 2025-01-07 VITALS
OXYGEN SATURATION: 96 % | BODY MASS INDEX: 30.66 KG/M2 | SYSTOLIC BLOOD PRESSURE: 134 MMHG | WEIGHT: 207 LBS | HEART RATE: 99 BPM | DIASTOLIC BLOOD PRESSURE: 50 MMHG | HEIGHT: 69 IN | TEMPERATURE: 97.7 F

## 2025-01-07 DIAGNOSIS — K81.0 ACUTE CHOLECYSTITIS: ICD-10-CM

## 2025-01-07 PROCEDURE — 99213 OFFICE O/P EST LOW 20 MIN: CPT

## 2025-01-07 RX ORDER — LISINOPRIL 10 MG/1
10 TABLET ORAL
Refills: 0 | Status: ACTIVE | COMMUNITY

## 2025-01-07 RX ORDER — ALBUTEROL SULFATE 2.5 MG/3ML
(2.5 MG/3ML) SOLUTION RESPIRATORY (INHALATION)
Refills: 0 | Status: ACTIVE | COMMUNITY

## 2025-01-10 ENCOUNTER — TRANSCRIPTION ENCOUNTER (OUTPATIENT)
Age: 70
End: 2025-01-10

## 2025-01-10 ENCOUNTER — RESULT REVIEW (OUTPATIENT)
Age: 70
End: 2025-01-10

## 2025-01-10 ENCOUNTER — APPOINTMENT (OUTPATIENT)
Dept: SURGERY | Facility: HOSPITAL | Age: 70
End: 2025-01-10

## 2025-01-10 PROCEDURE — S2900 ROBOTIC SURGICAL SYSTEM: CPT | Mod: NC

## 2025-01-10 PROCEDURE — 47562 LAPAROSCOPIC CHOLECYSTECTOMY: CPT

## 2025-01-15 ENCOUNTER — APPOINTMENT (OUTPATIENT)
Dept: SURGERY | Facility: CLINIC | Age: 70
End: 2025-01-15

## 2025-01-15 VITALS
SYSTOLIC BLOOD PRESSURE: 147 MMHG | WEIGHT: 200 LBS | BODY MASS INDEX: 29.62 KG/M2 | HEIGHT: 69 IN | OXYGEN SATURATION: 95 % | DIASTOLIC BLOOD PRESSURE: 78 MMHG | HEART RATE: 93 BPM

## 2025-05-12 ENCOUNTER — TRANSCRIPTION ENCOUNTER (OUTPATIENT)
Age: 70
End: 2025-05-12

## 2025-07-24 ENCOUNTER — APPOINTMENT (OUTPATIENT)
Dept: ENDOCRINOLOGY | Facility: CLINIC | Age: 70
End: 2025-07-24

## 2025-08-13 ENCOUNTER — NON-APPOINTMENT (OUTPATIENT)
Age: 70
End: 2025-08-13

## 2025-08-14 ENCOUNTER — APPOINTMENT (OUTPATIENT)
Dept: PAIN MANAGEMENT | Facility: CLINIC | Age: 70
End: 2025-08-14
Payer: MEDICARE

## 2025-08-14 ENCOUNTER — NON-APPOINTMENT (OUTPATIENT)
Age: 70
End: 2025-08-14

## 2025-08-14 VITALS
WEIGHT: 165 LBS | BODY MASS INDEX: 24.44 KG/M2 | HEIGHT: 69 IN | SYSTOLIC BLOOD PRESSURE: 119 MMHG | DIASTOLIC BLOOD PRESSURE: 72 MMHG

## 2025-08-14 DIAGNOSIS — Z87.898 PERSONAL HISTORY OF OTHER SPECIFIED CONDITIONS: ICD-10-CM

## 2025-08-14 DIAGNOSIS — M79.18 MYALGIA, OTHER SITE: ICD-10-CM

## 2025-08-14 DIAGNOSIS — F11.90 OPIOID USE, UNSPECIFIED, UNCOMPLICATED: ICD-10-CM

## 2025-08-14 PROCEDURE — 99204 OFFICE O/P NEW MOD 45 MIN: CPT

## 2025-08-14 PROCEDURE — G2211 COMPLEX E/M VISIT ADD ON: CPT
